# Patient Record
Sex: MALE | Race: OTHER | NOT HISPANIC OR LATINO | URBAN - METROPOLITAN AREA
[De-identification: names, ages, dates, MRNs, and addresses within clinical notes are randomized per-mention and may not be internally consistent; named-entity substitution may affect disease eponyms.]

---

## 2024-10-03 ENCOUNTER — EMERGENCY (EMERGENCY)
Facility: HOSPITAL | Age: 29
LOS: 1 days | Discharge: HOME | End: 2024-10-03
Attending: EMERGENCY MEDICINE
Payer: COMMERCIAL

## 2024-10-03 VITALS
DIASTOLIC BLOOD PRESSURE: 70 MMHG | SYSTOLIC BLOOD PRESSURE: 112 MMHG | HEART RATE: 79 BPM | RESPIRATION RATE: 18 BRPM | OXYGEN SATURATION: 95 % | TEMPERATURE: 98 F

## 2024-10-03 VITALS
WEIGHT: 179.9 LBS | SYSTOLIC BLOOD PRESSURE: 123 MMHG | RESPIRATION RATE: 18 BRPM | TEMPERATURE: 98 F | HEART RATE: 72 BPM | OXYGEN SATURATION: 96 % | DIASTOLIC BLOOD PRESSURE: 74 MMHG

## 2024-10-03 RX ORDER — HYDROXYZINE PAMOATE 50 MG
1 CAPSULE ORAL
Qty: 27 | Refills: 0
Start: 2024-10-03 | End: 2024-10-11

## 2024-10-03 RX ADMIN — Medication 2 MILLIGRAM(S): at 14:14

## 2024-10-03 NOTE — ED PROVIDER NOTE - NSFOLLOWUPINSTRUCTIONS_ED_ALL_ED_FT
Please follow up outpatient with your psychiatrist    Anxiety    WHAT YOU NEED TO KNOW:    What do I need to know about anxiety? Anxiety is a condition that causes you to feel extremely worried or nervous. The feelings are so strong that they can cause problems with your daily activities or sleep. Anxiety may be triggered by something you fear, or it may happen without a cause. Family or work stress, smoking, caffeine, and alcohol can increase your risk for anxiety. Certain medicines or health conditions can also increase your risk. Anxiety can become a long-term condition if it is not managed or treated.    What are the signs and symptoms of anxiety?    Fatigue or muscle tightness    Shaking, restlessness, or irritability    Problems focusing    Trouble sleeping    Feeling jumpy, easily startled, or dizzy    Rapid heartbeat or shortness of breath  How is anxiety diagnosed? Tell your healthcare provider when your symptoms began and what triggers them. Tell your provider if anxiety affects your daily activities. Your provider will also ask about your medical history and if you have family members with a similar condition. Tell your provider about your past and current alcohol, nicotine, or drug use. Any of these may worsen anxiety.    How is anxiety treated? Treatment depends on how severe your symptoms are. The following are common treatments for anxiety:    Cognitive behavioral therapy (CBT) teaches you how to identify and change negative thought patterns.    Anxiety or antidepressant medicine may help relieve or prevent anxiety. You may need to take the medicine for several weeks before you begin to feel better. Tell your healthcare provider about any side effects or problems you have with your medicine. The type or amount of medicine may need to be changed. Medicines are usually used along with therapy.  What can I do to manage anxiety?    Talk to someone about your anxiety. Your healthcare provider may suggest counseling. You might feel more comfortable talking with a friend or family member about your anxiety. Choose someone you know will be supportive and encouraging.    Get regular physical activity. Physical activity can lower your stress, improve your mood, and help you sleep better. Work with your healthcare provider to develop a plan that you enjoy.   FAMILY WALKING FOR EXERCISE      Create a regular sleep schedule. A routine can help you relax before bed. Listen to music, read, or do yoga. Try to go to bed and wake up at the same time every day. Sleep is important for emotional health.    Do activities you enjoy. Spend time with friends, or do something fun. Choose activities you are familiar with or comfortable doing. This may help prevent anxiety.    Practice deep breathing. Deep breathing can help you relax when you feel anxious. Focus on taking slow, deep breaths several times a day, or during an anxiety attack. Breathe in through your nose and out through your mouth. Deep breathing combined with meditation or listening to music may help you feel calmer.    Do not smoke. Nicotine and other chemicals in cigarettes and cigars can increase anxiety. Ask your healthcare provider for information if you currently smoke and need help to quit. E-cigarettes or smokeless tobacco still contain nicotine. Talk to your healthcare provider before you use these products.    Do not have caffeine. Caffeine can make your symptoms worse. Do not have foods or drinks that are meant to increase your energy level.    Do not drink alcohol or use drugs. Alcohol and drugs can worsen anxiety or make it hard to manage. Talk to your therapist or healthcare provider if you need help to quit.  Wellness Tips  The following resources are available at any time to help you, if needed:    Contact a suicide prevention organization:  For the 98 Suicide and Crisis Lifeline:  Call or text 98    Send a chat on https://[employer]/chat    Call 1-612.998.8076 (1-800-273-TALK)    For the Suicide Hotline, call 1-470.960.1704 (9-849-TPGLSML)    For a list of international numbers: https://save.org/find-help/international-resources/  Where can I find more information or support?    [employer] [facility]  [address]  [place], [place]  Phone: 1-607.568.2139  Phone: 1-301.969.2063  Web Address: http://www.bobby.org  [facility] Suicide and Crisis Lifeline  [address]  [place], [place]  Phone: 1-713-025  Web Address: http://www.suicidepreventionlifeline.org OR https://[employer]/chat/  Call your local emergency number (911 in the ) if:    You have chest pain, tightness, or heaviness that may spread to your shoulders, arms, jaw, neck, or back.    You think about harming yourself or someone else.  When should I call my doctor?    Your symptoms get worse or do not get better with treatment.    Your anxiety keeps you from doing your regular daily activities.    You have new symptoms since your last visit.    You have questions or concerns about your condition or care.  CARE AGREEMENT:    You have the right to help plan your care. Learn about your health condition and how it may be treated. Discuss treatment options with your healthcare providers to decide what care you want to receive. You always have the right to refuse treatment.    © [employer] 1973, 2024

## 2024-10-03 NOTE — ED PROVIDER NOTE - PATIENT PORTAL LINK FT
You can access the FollowMyHealth Patient Portal offered by [facility] by registering at the following website: [email or web address]. By joining [facility] [employer]s FollowMyHealth portal, you will also be able to view your health information using other applications (apps) compatible with our system.

## 2024-10-03 NOTE — ED PROVIDER NOTE - PHYSICAL EXAMINATION
Gen: No acute distress, comfortably in bed  HENT: Atraumatic, normocephalic, neck is supple without adenopathy, no JVD  Eyes: PERRLA, conjuctivae are clear, non-icteric sclera  NEURO: A&Ox3, no focal deficits, moving all extremities spontaneously, CN II-XII grossly intact  Resp: CTAB, no wrr, non-labored breathing  Cardio: Regular rate and rhytum, S1/S2 heard, no murmurs, gallops or rubs  Abdominal: Soft, non-tender, non-distended, no appreciable masses, normoactive bowel sounds  : No CVA tenderness  Extremities: Nontender, no clubbing, cyanosis, or edema  Skin: Warm, dry, intact without rashes or lesions
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
## 2024-10-03 NOTE — ED PROVIDER NOTE - ATTENDING CONTRIBUTION TO CARE
Patient was seen and evaluated in the ED for anxiety and prescription refill. No significant findings on physical exam. Patient given ativan 2mg PO time. Explained to patient we can give him some medication for his anxiety for today, but he will need to follow up with psychiatrist for prescription management. Patient in agreement with plan
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
## 2025-03-14 NOTE — ED PROVIDER NOTE - NS ED MD DISPO DISCHARGE CCDA
Called patient to discuss. Lvm to call back.   Patient/Caregiver provided printed discharge information.